# Patient Record
Sex: MALE | Race: WHITE | NOT HISPANIC OR LATINO | Employment: FULL TIME | URBAN - METROPOLITAN AREA
[De-identification: names, ages, dates, MRNs, and addresses within clinical notes are randomized per-mention and may not be internally consistent; named-entity substitution may affect disease eponyms.]

---

## 2022-10-13 ENCOUNTER — PATIENT MESSAGE (OUTPATIENT)
Dept: GASTROENTEROLOGY | Facility: CLINIC | Age: 47
End: 2022-10-13
Payer: COMMERCIAL

## 2022-10-28 ENCOUNTER — PATIENT MESSAGE (OUTPATIENT)
Dept: GASTROENTEROLOGY | Facility: CLINIC | Age: 47
End: 2022-10-28
Payer: COMMERCIAL

## 2022-11-01 ENCOUNTER — TELEPHONE (OUTPATIENT)
Dept: GASTROENTEROLOGY | Facility: CLINIC | Age: 47
End: 2022-11-01
Payer: COMMERCIAL

## 2022-11-01 RX ORDER — LEVOTHYROXINE SODIUM 100 UG/1
100 TABLET ORAL DAILY
COMMUNITY
Start: 2022-10-04

## 2022-11-01 NOTE — TELEPHONE ENCOUNTER
Reached out to patient and l/m for patient to call back to be scheduled for 2 yr colonoscopy and chart pre charted from [facility]. Will need to be confirmed w/ patient to finish updating chart.

## 2022-11-08 VITALS — BODY MASS INDEX: 28.23 KG/M2 | WEIGHT: 220 LBS | HEIGHT: 74 IN

## 2022-11-08 DIAGNOSIS — Z80.0 FAMILY HISTORY OF COLON CANCER: Primary | ICD-10-CM

## 2022-11-08 DIAGNOSIS — Z86.010 PERSONAL HISTORY OF COLONIC POLYPS: ICD-10-CM

## 2022-11-08 RX ORDER — OMEPRAZOLE AND SODIUM BICARBONATE 40; 1100 MG/1; MG/1
1 CAPSULE ORAL
COMMUNITY

## 2022-11-08 RX ORDER — ROSUVASTATIN CALCIUM 20 MG/1
20 TABLET, COATED ORAL DAILY
COMMUNITY

## 2022-11-08 NOTE — TELEPHONE ENCOUNTER
Returned call to patient and got chart updated and scheduled and patient voiced understood institutions, pt doesn't have hx of cpap, problem walking or heart stents. REYES

## 2022-11-09 RX ORDER — SOD SULF/POT CHLORIDE/MAG SULF 1.479 G
12 TABLET ORAL DAILY
Qty: 24 TABLET | Refills: 0 | Status: SHIPPED | OUTPATIENT
Start: 2022-11-09

## 2022-11-09 NOTE — TELEPHONE ENCOUNTER
[facility] [facility]  [address] [place] [address]  Phone: [phone]  Fax: [phone]    History & Physical         Provider: Dr. Lauro Collazo    Patient Name: Jamil HOLGUIN (age):1975  47 y.o.           Gender: male   Phone: [phone]     Referring Physician: Jose Rafael Gregory     Vital Signs:   Height - 6'2  Weight - 220 lb   BMI -  28.25    Plan: Colonoscopy  @ [facility]    Encounter Diagnoses   Name Primary?    Family history of colon cancer Yes    Personal history of colonic polyps            History:      Past Medical History:   Diagnosis Date    Acid reflux     BMI 28.0-28.9,adult     High cholesterol     Thyroiditis, unspecified       Past Surgical History:   Procedure Laterality Date    Arm Surgery  Right     COLONOSCOPY  2020    no polyps    COLONOSCOPY  2021    polyps    COLONOSCOPY  2019    polyps    ESOPHAGOGASTRODUODENOSCOPY  2019    hiatal Hernia    KNEE SURGERY Left     TONSILLECTOMY, ADENOIDECTOMY        Medication List with Changes/Refills   New Medications    SOD SULF-POT CHLORIDE-MAG SULF (SUTAB) 1.479-0.188- 0.225 GRAM TABLET    Take 12 tablets by mouth once daily. Take according to package instructions with indicated amount of water. No breakfast day before test. May substitute with Suprep, Clenpiq, Plenvu, Moviprep or GoLytely based on Rx plan and patient preference.   Current Medications    LEVOTHYROXINE (SYNTHROID) 100 MCG TABLET    Take 100 mcg by mouth once daily.    MULTIVITAMIN WITH MINERALS TABLET    Take 1 tablet by mouth once daily.    OMEPRAZOLE-SODIUM BICARBONATE (ZEGERID) 40-1.1 MG-GRAM PER CAPSULE    Take 1 capsule by mouth before breakfast.    ROSUVASTATIN (CRESTOR) 20 MG TABLET    Take 20 mg by mouth once daily.      Review of patient's allergies indicates:  No Known Allergies   Family History   Problem Relation Age of Onset    Colon  polyps Mother     Colon polyps Father       Social History     Tobacco Use    Smoking status: Never    Smokeless tobacco: Never   Substance Use Topics    Alcohol use: Yes     Alcohol/week: 1.0 standard drink     Types: 1 Shots of liquor per week    Drug use: Never        Physical Examination:     General Appearance:___________________________  HEENT: _____________________________________  Abdomen:____________________________________  Heart:________________________________________  Lungs:_______________________________________  Extremities:___________________________________  Skin:_________________________________________  Endocrine:____________________________________  Genitourinary:_________________________________  Neurological:__________________________________      Patient has been evaluated immediately prior to sedation and is medically cleared for endoscopy with IVCS as an ASA class: ______      Physician Signature: _________________________       Date: ________  Time: ________

## 2023-01-09 ENCOUNTER — OUTSIDE PLACE OF SERVICE (OUTPATIENT)
Dept: GASTROENTEROLOGY | Facility: CLINIC | Age: 48
End: 2023-01-09

## 2023-01-09 LAB — CRC RECOMMENDATION EXT: NORMAL

## 2023-01-09 PROCEDURE — G0105 COLORECTAL SCRN; HI RISK IND: HCPCS | Mod: ,,, | Performed by: INTERNAL MEDICINE

## 2023-01-09 PROCEDURE — G0105 COLORECTAL SCRN; HI RISK IND: ICD-10-PCS | Mod: ,,, | Performed by: INTERNAL MEDICINE

## 2023-02-03 ENCOUNTER — DOCUMENTATION ONLY (OUTPATIENT)
Dept: GASTROENTEROLOGY | Facility: CLINIC | Age: 48
End: 2023-02-03
Payer: COMMERCIAL

## 2024-07-05 ENCOUNTER — TELEPHONE (OUTPATIENT)
Dept: GASTROENTEROLOGY | Facility: CLINIC | Age: 49
End: 2024-07-05
Payer: COMMERCIAL

## 2024-07-05 NOTE — TELEPHONE ENCOUNTER
----- Message from Patricia Rivero sent at 7/5/2024  9:50 AM CDT -----  Regarding: medical records  Contact: Padmini yang/MARIA DE JESUS  Padmini yang/Merged with [facility] calling for medical records for the pt.  She stated he was a previous pt of [facility] and was seen in 04/2024.  She can be reached at [phone] and fax number same.    Thanks,

## 2024-07-05 NOTE — TELEPHONE ENCOUNTER
Spoke with Padmini. I advised her to send us a signed LALI and we will be able to fax the medical records. Our fax number was given. DMP

## 2024-07-07 ENCOUNTER — TELEPHONE (OUTPATIENT)
Dept: GASTROENTEROLOGY | Facility: CLINIC | Age: 49
End: 2024-07-07
Payer: COMMERCIAL

## 2024-07-08 NOTE — TELEPHONE ENCOUNTER
Patient due for colonoscopy 1/9/2025. Former [facility] patient. Update Epic chart and schedule.  May have sample box of prep if one available for that date.  NBP

## 2024-07-17 VITALS — BODY MASS INDEX: 29.52 KG/M2 | HEIGHT: 74 IN | WEIGHT: 230 LBS

## 2024-07-17 DIAGNOSIS — Z85.038 HISTORY OF COLON CANCER: Primary | ICD-10-CM

## 2024-07-17 DIAGNOSIS — Z12.11 SCREENING FOR MALIGNANT NEOPLASM OF COLON: ICD-10-CM

## 2024-07-17 DIAGNOSIS — Z86.010 PERSONAL HISTORY OF COLONIC POLYPS: ICD-10-CM

## 2024-07-17 RX ORDER — LEVOTHYROXINE, LIOTHYRONINE 57; 13.5 UG/1; UG/1
90 TABLET ORAL
COMMUNITY
Start: 2024-07-01

## 2024-07-17 NOTE — PROGRESS NOTES
Rec'd incoming call from [facility] and she asked that I reach out to pt and direct schedule him for 1/6/25 - Monday. Pt is a former [facility]- last colonoscopy was on 1/9/23, grade/stage  internal hemorrhoids, midl diverticulosis of the sigmoid colon, no polyp recurrence at hepatic flexure. Updated medical chart. Pt denies any hx of seizures, kidney disease, or heart stents. Updated Epic schedule. Pt will stop the clinic to pickup prep instructions and sample of Sutab. valentin

## 2024-07-19 NOTE — TELEPHONE ENCOUNTER
See my encounter on 7/17/24 Orders only..not sure how I ended up her, but I have so s/w pt and directed scheduled. valentin

## 2024-12-16 ENCOUNTER — TELEPHONE (OUTPATIENT)
Dept: GASTROENTEROLOGY | Facility: CLINIC | Age: 49
End: 2024-12-16
Payer: COMMERCIAL

## 2024-12-16 VITALS — BODY MASS INDEX: 29.52 KG/M2 | WEIGHT: 230 LBS | HEIGHT: 74 IN

## 2024-12-16 DIAGNOSIS — Z86.0100 HISTORY OF COLONIC POLYPS: ICD-10-CM

## 2024-12-16 DIAGNOSIS — Z85.038 HISTORY OF COLON CANCER: Primary | ICD-10-CM

## 2024-12-16 NOTE — TELEPHONE ENCOUNTER
"[place] [facility]  [address] [place] [address]  Phone: [phone]  Fax: [phone]    History & Physical         Provider: Dr. Lissy Castro    Patient Name: Jamil HOLGUIN (age):1975  49 y.o.           Gender: male   Phone: [phone]     Referring Physician: Jose Rafael Gregory     Vital Signs:   Height - 6' 2"  Weight - 230 lb  BMI -  29.53    Plan: Colonoscopy (H/O HF polyp w HGD/Intramucosal adenocarcinoma) @ [facility]    Encounter Diagnoses   Name Primary?    History of colon cancer Yes    History of colonic polyps            History:      Past Medical History:   Diagnosis Date    Acid reflux     BMI 28.0-28.9,adult     High cholesterol     History of colon cancer     TVA w/HGD/ intramucosal adenocarcinoma    Personal history of colonic polyps     Thyroiditis, unspecified       Past Surgical History:   Procedure Laterality Date    Arm Surgery  Right     COLONOSCOPY  2020    no polyps    COLONOSCOPY  2021    polyps    COLONOSCOPY  2019    polyps    ESOPHAGOGASTRODUODENOSCOPY  2019    hiatal Hernia    KNEE SURGERY Left     TONSILLECTOMY, ADENOIDECTOMY        Medication List with Changes/Refills   Current Medications    MULTIVITAMIN WITH MINERALS TABLET    Take 1 tablet by mouth once daily.    NP THYROID 90 MG TAB    Take 90 mg by mouth.    OMEPRAZOLE-SODIUM BICARBONATE (ZEGERID) 40-1.1 MG-GRAM PER CAPSULE    Take 1 capsule by mouth before breakfast.    ROSUVASTATIN (CRESTOR) 20 MG TABLET    Take 20 mg by mouth once daily.    SOD SULF-POT CHLORIDE-MAG SULF (SUTAB) 1.479-0.188- 0.225 GRAM TABLET    Take 12 tablets by mouth once daily. Take according to package instructions with indicated amount of water. No breakfast day before test. May substitute with Suprep, Clenpiq, Plenvu, Moviprep or GoLytely based on Rx plan and patient preference.      Review of patient's " allergies indicates:  No Known Allergies   Family History   Problem Relation Name Age of Onset    Colon polyps Mother      Colon polyps Father      Colon cancer Neg Hx      Crohn's disease Neg Hx      Liver disease Neg Hx      Pancreatic cancer Neg Hx        Social History     Tobacco Use    Smoking status: Never    Smokeless tobacco: Never   Substance Use Topics    Alcohol use: Yes     Alcohol/week: 1.0 standard drink of alcohol     Types: 1 Shots of liquor per week    Drug use: Never        Physical Examination:     General Appearance:___________________________  HEENT: _____________________________________  Abdomen:____________________________________  Heart:________________________________________  Lungs:_______________________________________  Extremities:___________________________________  Skin:_________________________________________  Endocrine:____________________________________  Genitourinary:_________________________________  Neurological:__________________________________      Patient has been evaluated immediately prior to sedation and is medically cleared for endoscopy with IVCS as an ASA class: ______      Physician Signature: _________________________       Date: ________  Time: ________

## 2024-12-16 NOTE — TELEPHONE ENCOUNTER
S/w pt and told him that I was calling as a courtesy regarding up coming Colon with NBP on 1/6/24, Monday and wanted to verify that he has his paper prep instructions and meds. Pt stated he has both. I also mentioned that [facility] will call the day before (FRI) with the arrival time, GI Lab is located on the third floor, and to pre-register anytime the week before. valentin

## 2025-01-06 ENCOUNTER — OUTSIDE PLACE OF SERVICE (OUTPATIENT)
Dept: GASTROENTEROLOGY | Facility: CLINIC | Age: 50
End: 2025-01-06

## 2025-01-06 LAB — CRC RECOMMENDATION EXT: NORMAL

## 2025-02-10 ENCOUNTER — DOCUMENTATION ONLY (OUTPATIENT)
Dept: GASTROENTEROLOGY | Facility: CLINIC | Age: 50
End: 2025-02-10
Payer: COMMERCIAL